# Patient Record
Sex: FEMALE | Race: WHITE
[De-identification: names, ages, dates, MRNs, and addresses within clinical notes are randomized per-mention and may not be internally consistent; named-entity substitution may affect disease eponyms.]

---

## 2017-05-23 ENCOUNTER — HOSPITAL ENCOUNTER (OUTPATIENT)
Dept: HOSPITAL 83 - RAD | Age: 77
Discharge: HOME | End: 2017-05-23
Attending: INTERNAL MEDICINE
Payer: MEDICARE

## 2017-05-23 DIAGNOSIS — M51.36: Primary | ICD-10-CM

## 2017-05-23 DIAGNOSIS — M41.86: ICD-10-CM

## 2017-05-23 DIAGNOSIS — M47.816: ICD-10-CM

## 2017-05-23 DIAGNOSIS — M48.06: ICD-10-CM

## 2017-05-23 DIAGNOSIS — M12.88: ICD-10-CM

## 2018-10-31 ENCOUNTER — HOSPITAL ENCOUNTER (OUTPATIENT)
Dept: HOSPITAL 83 - MAMMO | Age: 78
Discharge: HOME | End: 2018-10-31
Attending: PHYSICIAN ASSISTANT
Payer: MEDICARE

## 2018-10-31 DIAGNOSIS — Z12.31: Primary | ICD-10-CM

## 2020-08-24 ENCOUNTER — HOSPITAL ENCOUNTER (OUTPATIENT)
Dept: HOSPITAL 83 - MAMMO | Age: 80
Discharge: HOME | End: 2020-08-24
Attending: PHYSICIAN ASSISTANT
Payer: MEDICARE

## 2020-08-24 DIAGNOSIS — Z12.31: Primary | ICD-10-CM

## 2021-05-06 ENCOUNTER — HOSPITAL ENCOUNTER (OUTPATIENT)
Dept: HOSPITAL 83 - RAD | Age: 81
Discharge: HOME | End: 2021-05-06
Attending: PHYSICIAN ASSISTANT
Payer: MEDICARE

## 2021-05-06 DIAGNOSIS — M19.079: ICD-10-CM

## 2021-05-06 DIAGNOSIS — Z78.0: ICD-10-CM

## 2021-05-06 DIAGNOSIS — M85.89: Primary | ICD-10-CM

## 2021-11-02 ENCOUNTER — HOSPITAL ENCOUNTER (OUTPATIENT)
Dept: HOSPITAL 83 - MAMMO | Age: 81
Discharge: HOME | End: 2021-11-02
Attending: PHYSICIAN ASSISTANT
Payer: MEDICARE

## 2021-11-02 DIAGNOSIS — Z12.31: Primary | ICD-10-CM

## 2022-07-28 PROBLEM — M16.11 PRIMARY OSTEOARTHRITIS OF RIGHT HIP: Status: ACTIVE | Noted: 2022-07-28

## 2022-08-02 NOTE — H&P
[address] [address]                       PREOPERATIVE HISTORY AND PHYSICAL    PATIENT NAME: Rossy Blake                      :        1940  MED REC NO:   [id number]                            ROOM:  ACCOUNT NO:   [de-identified]                           ADMIT DATE: 2022  PROVIDER:     Leena Garcia. ALEJANDRO Russell    DATE OF SURGERY:  2022. Dictating for Demetrius Shankar M.D.    CHIEF COMPLAINT:  Right hip pain. HISTORY OF PRESENT ILLNESS:  This is an 80-year-old female with chronic  right hip pain secondary to osteoarthritis. She has failed conservative  treatments with anti-inflammatories, analgesics, and home exercises. She is now scheduled for right total hip arthroplasty. PAST MEDICAL HISTORY:  GERD, hypertension, and kidney disease. PAST SURGICAL HISTORY:  Right ankle ORIF and prior wrist surgery. CURRENT MEDICATIONS:  Tramadol, amitriptyline, trazodone,  triamterene/hydrochlorothiazide, irbesartan, Mobic, ropinirole, and  metoprolol. ALLERGIES:  None. FAMILY HISTORY:  Unremarkable. SOCIAL HISTORY:  Admits to social alcohol consumption. Denies tobacco  use. PRIMARY CARE PROVIDER:  Listed as Danielle Najera PA-C    REVIEW OF SYSTEMS:  ALLERGIES:  As stated above. SKIN AND LYMPHATICS:  Denies rashes or lesions. CNS:  No prior CVAs. RESPIRATORY:  No cough or shortness of breath. CIRCULATORY:  History of hypertension. DIGESTIVE:  History of GERD. GENITOURINARY:  History of kidney disease. METABOLIC AND ENDOCRINE:  No thyroid disease or diabetes. HEMATOLOGIC:  No anemia. MUSCULOSKELETAL:  Positive OA. PSYCHIATRIC:  Negative. PHYSICAL EXAMINATION:  GENERAL APPEARANCE:  A well-nourished and well-developed 80-year-old  female, in no acute distress. Alert and oriented x3. SKIN:  Without masses, rashes, or lesions. LYMPH NODES:  No adenopathy.   HEENT:  Head:  Normocephalic and atraumatic. Ears:  Clear and  functional.  Eyes and fundi:  PERRLA. Nose:  Clear without deviation. Mouth, teeth, and throat:  Clear and functional.  NECK:  Supple. No JVD. CHEST:  Normal excursion. BREASTS:  Deferred. LUNGS:  Clear to auscultation and percussion. HEART:  Regular rate and rhythm. No murmurs, gallops, or rubs  appreciated. ABDOMEN:  Rounded, soft, and nontender. Hernia negative. BACK:  Exam nontender. EXTREMITIES:  Without clubbing, cyanosis, or edema. Exam of right hip:   90 degrees of flexion. 10 degrees of flexion contracture. 0 degrees of  internal rotation or external rotation. 10 degrees of abduction, all  with pain and crepitus. 2/2 pulses. Neurovascular status distally is  intact. NEUROLOGIC:  Cranial nerves II through XII are grossly intact. GENITAL:  Exam deferred. RECTAL:  Exam deferred. DIAGNOSTIC IMPRESSION:  OA, right hip. PROCEDURE:  Right total hip arthroplasty.         Dawson Velez PA-C    D: 08/02/2022 8:46:31       T: 08/02/2022 9:44:06     [email or web address]  Job#: [id number]     Doc#: [id number]    CC:

## 2022-08-12 ENCOUNTER — HOSPITAL ENCOUNTER (OUTPATIENT)
Dept: PREADMISSION TESTING | Age: 82
Discharge: HOME | End: 2022-08-12
Payer: MEDICARE

## 2022-08-12 VITALS
WEIGHT: 204 LBS | BODY MASS INDEX: 36.14 KG/M2 | TEMPERATURE: 97.7 F | RESPIRATION RATE: 14 BRPM | DIASTOLIC BLOOD PRESSURE: 63 MMHG | OXYGEN SATURATION: 98 % | HEIGHT: 63 IN | SYSTOLIC BLOOD PRESSURE: 134 MMHG

## 2022-08-12 LAB
ANION GAP SERPL CALCULATED.3IONS-SCNC: 11 MMOL/L (ref 7–16)
BASOPHILS ABSOLUTE: 0.02 E9/L (ref 0–0.2)
BASOPHILS RELATIVE PERCENT: 0.3 % (ref 0–2)
BUN BLDV-MCNC: 26 MG/DL (ref 6–23)
CALCIUM SERPL-MCNC: 9.9 MG/DL (ref 8.6–10.2)
CHLORIDE BLD-SCNC: 93 MMOL/L (ref 98–107)
CO2: 27 MMOL/L (ref 22–29)
CREAT SERPL-MCNC: 1.1 MG/DL (ref 0.5–1)
EOSINOPHILS ABSOLUTE: 0.08 E9/L (ref 0.05–0.5)
EOSINOPHILS RELATIVE PERCENT: 1.2 % (ref 0–6)
GFR AFRICAN AMERICAN: 58
GFR NON-AFRICAN AMERICAN: 48 ML/MIN/1.73
GLUCOSE BLD-MCNC: 106 MG/DL (ref 74–99)
HCT VFR BLD CALC: 39.3 % (ref 34–48)
HEMOGLOBIN: 12.9 G/DL (ref 11.5–15.5)
IMMATURE GRANULOCYTES #: 0.01 E9/L
IMMATURE GRANULOCYTES %: 0.1 % (ref 0–5)
LYMPHOCYTES ABSOLUTE: 2.7 E9/L (ref 1.5–4)
LYMPHOCYTES RELATIVE PERCENT: 39.6 % (ref 20–42)
MCH RBC QN AUTO: 31.8 PG (ref 26–35)
MCHC RBC AUTO-ENTMCNC: 32.8 % (ref 32–34.5)
MCV RBC AUTO: 96.8 FL (ref 80–99.9)
MONOCYTES ABSOLUTE: 0.63 E9/L (ref 0.1–0.95)
MONOCYTES RELATIVE PERCENT: 9.3 % (ref 2–12)
NEUTROPHILS ABSOLUTE: 3.37 E9/L (ref 1.8–7.3)
NEUTROPHILS RELATIVE PERCENT: 49.5 % (ref 43–80)
PDW BLD-RTO: 12.3 FL (ref 11.5–15)
PLATELET # BLD: 273 E9/L (ref 130–450)
PMV BLD AUTO: 8.5 FL (ref 7–12)
POTASSIUM SERPL-SCNC: 3.9 MMOL/L (ref 3.5–5)
RBC # BLD: 4.06 E12/L (ref 3.5–5.5)
SODIUM BLD-SCNC: 131 MMOL/L (ref 132–146)
WBC # BLD: 6.8 E9/L (ref 4.5–11.5)

## 2022-08-12 PROCEDURE — 85025 COMPLETE CBC W/AUTO DIFF WBC: CPT

## 2022-08-12 PROCEDURE — 36415 COLL VENOUS BLD VENIPUNCTURE: CPT

## 2022-08-12 PROCEDURE — 87081 CULTURE SCREEN ONLY: CPT

## 2022-08-12 PROCEDURE — 80048 BASIC METABOLIC PNL TOTAL CA: CPT

## 2022-08-12 RX ORDER — TRIAMTERENE AND HYDROCHLOROTHIAZIDE 37.5; 25 MG/1; MG/1
1 TABLET ORAL DAILY
COMMUNITY

## 2022-08-12 RX ORDER — TRAMADOL HYDROCHLORIDE 50 MG/1
50 TABLET ORAL 2 TIMES DAILY PRN
Status: ON HOLD | COMMUNITY
End: 2022-08-18 | Stop reason: HOSPADM

## 2022-08-12 RX ORDER — TRAZODONE HYDROCHLORIDE 50 MG/1
50 TABLET ORAL NIGHTLY
COMMUNITY

## 2022-08-12 RX ORDER — METOPROLOL SUCCINATE 100 MG/1
200 TABLET, EXTENDED RELEASE ORAL DAILY
COMMUNITY

## 2022-08-12 RX ORDER — IRBESARTAN 75 MG/1
75 TABLET ORAL DAILY
COMMUNITY

## 2022-08-12 RX ORDER — ROPINIROLE 1 MG/1
1 TABLET, FILM COATED ORAL EVERY EVENING
COMMUNITY

## 2022-08-12 ASSESSMENT — PAIN DESCRIPTION - FREQUENCY: FREQUENCY: INTERMITTENT

## 2022-08-12 ASSESSMENT — PAIN DESCRIPTION - LOCATION: LOCATION: HIP

## 2022-08-12 ASSESSMENT — PAIN DESCRIPTION - DESCRIPTORS: DESCRIPTORS: ACHING;JABBING

## 2022-08-12 ASSESSMENT — PAIN DESCRIPTION - ORIENTATION: ORIENTATION: RIGHT

## 2022-08-12 ASSESSMENT — PAIN SCALES - GENERAL: PAINLEVEL_OUTOF10: 9

## 2022-08-12 ASSESSMENT — PAIN - FUNCTIONAL ASSESSMENT: PAIN_FUNCTIONAL_ASSESSMENT: PREVENTS OR INTERFERES WITH MANY ACTIVE NOT PASSIVE ACTIVITIES

## 2022-08-12 ASSESSMENT — PAIN DESCRIPTION - PAIN TYPE: TYPE: CHRONIC PAIN

## 2022-08-12 NOTE — PROGRESS NOTES
I met with Mrs. Danny Cleaning, and her  this am  for an orthopaedic education class. We discussed information regarding pre, intra, post-op, discharge, and LOS expectations. I provided ortho education materials. I encouraged the patient to call with any questions or concerns.

## 2022-08-12 NOTE — PROGRESS NOTES
[facility] PRE-ADMISSION TESTING INSTRUCTIONS    The Preadmission Testing patient is instructed accordingly using the following criteria (check applicable):    ARRIVAL INSTRUCTIONS:  [x] Parking the day of Surgery is located in the Main Entrance lot. Upon entering the door, make an immediate right to the surgery reception desk    [x] Bring photo ID and insurance card    [] Bring in a copy of Living will or Durable Power of  papers. [x] Please be sure to arrange for responsible adult to provide transportation to and from the hospital    [x] Please arrange for responsible adult to be with you for the 24 hour period post procedure due to having anesthesia    [x] If you awake am of surgery not feeling well or have temperature >100 please call [phone]    GENERAL INSTRUCTIONS:    [x] Nothing by mouth after midnight, including gum, candy, mints or water    [x] You may brush your teeth, but do not swallow any water    [x] Take medications as instructed with 1-2 oz of water    [x] Stop herbal supplements and vitamins 5 days prior to procedure    [] Follow preop dosing of blood thinners per physician instructions    [] Take 1/2 dose of evening insulin, but no insulin after midnight    [] No oral diabetic medications after midnight    [] If diabetic and have low blood sugar or feel symptomatic, take 1-2oz apple juice only    [] Bring inhalers day of surgery    [] Bring C-PAP/ Bi-Pap day of surgery    [] Bring urine specimen day of surgery    [] Shower or bath with soap, lather and rinse well, AM of Surgery, no lotion, powders or creams to surgical site    [] Follow bowel prep as instructed per surgeon    [x] No tobacco products within 24 hours of surgery     [x] No alcohol or illegal drug use within 24 hours of surgery.     [x] Jewelry, body piercing's, eyeglasses, contact lenses and dentures are not permitted into surgery (bring cases)      [x] Please do not wear any nail polish, make up or hair products on the day of surgery    [x] You can expect a call the business day prior to procedure to notify you if your arrival time changes    [x] If you receive a survey after surgery we would greatly appreciate your comments    [] Parent/guardian of a minor must accompany their child and remain on the premises  the entire time they are under our care     [] Pediatric patients may bring favorite toy, blanket or comfort item with them    [] A caregiver or family member must remain with the patient during their stay if they are mentally handicapped, have dementia, disoriented or unable to use a call light or would be a safety concern if left unattended    [x] Please notify surgeon if you develop any illness between now and time of surgery (cold, cough, sore throat, fever, nausea, vomiting) or any signs of infections  including skin, wounds, and dental.    [x]  The Outpatient Pharmacy is available to fill your prescription here on your day of surgery, ask your preop nurse for details    [] Other instructions    EDUCATIONAL MATERIALS PROVIDED:    [x] PAT Preoperative Education Packet/Booklet     [x] Medication List    [] Transfusion bracelet applied with instructions    [x] Shower with soap, lather and rinse well, and use CHG wipes provided the evening before surgery as instructed    [x] Incentive spirometer with instructions

## 2022-08-14 LAB — MRSA CULTURE ONLY: NORMAL

## 2022-08-16 NOTE — CARE COORDINATION
Social Work:    Mrs. Dakota William is scheduled for a right CHRISTINA on Wednesday, August 17th. Social service met with Maryann Hernandez in ortho camp on August 12th and provided her with all information related to the discharge process. Maryann Hernandez resides with her  in a ranch home with one entry step, a walk-in shower and standard commode set-up. She is prepared to return home with [facility] & -DME (wheeled walker & 3-1 commode) after choices were provided.      Electronically signed by LISA Saez on 8/16/2022 at 12:57 PM

## 2022-08-17 ENCOUNTER — APPOINTMENT (OUTPATIENT)
Dept: GENERAL RADIOLOGY | Age: 82
End: 2022-08-17
Attending: ORTHOPAEDIC SURGERY
Payer: MEDICARE

## 2022-08-17 ENCOUNTER — ANESTHESIA EVENT (OUTPATIENT)
Dept: OPERATING ROOM | Age: 82
End: 2022-08-17
Payer: MEDICARE

## 2022-08-17 ENCOUNTER — HOSPITAL ENCOUNTER (OUTPATIENT)
Age: 82
Setting detail: OBSERVATION
Discharge: HOME | End: 2022-08-18
Attending: ORTHOPAEDIC SURGERY | Admitting: ORTHOPAEDIC SURGERY
Payer: MEDICARE

## 2022-08-17 ENCOUNTER — ANESTHESIA (OUTPATIENT)
Dept: OPERATING ROOM | Age: 82
End: 2022-08-17
Payer: MEDICARE

## 2022-08-17 DIAGNOSIS — Z47.1 AFTERCARE FOLLOWING JOINT REPLACEMENT SURGERY, UNSPECIFIED JOINT: ICD-10-CM

## 2022-08-17 DIAGNOSIS — Z96.641 STATUS POST RIGHT HIP REPLACEMENT: ICD-10-CM

## 2022-08-17 DIAGNOSIS — M16.11 PRIMARY OSTEOARTHRITIS OF RIGHT HIP: Primary | ICD-10-CM

## 2022-08-17 DIAGNOSIS — M16.11 UNILATERAL PRIMARY OSTEOARTHRITIS, RIGHT HIP: ICD-10-CM

## 2022-08-17 PROCEDURE — 73501 X-RAY EXAM HIP UNI 1 VIEW: CPT

## 2022-08-17 PROCEDURE — 6370000000 HC RX 637 (ALT 250 FOR IP): Performed by: PHYSICIAN ASSISTANT

## 2022-08-17 PROCEDURE — 3600000015 HC SURGERY LEVEL 5 ADDTL 15MIN: Performed by: ORTHOPAEDIC SURGERY

## 2022-08-17 PROCEDURE — 6360000002 HC RX W HCPCS: Performed by: ORTHOPAEDIC SURGERY

## 2022-08-17 PROCEDURE — 7100000001 HC PACU RECOVERY - ADDTL 15 MIN: Performed by: ORTHOPAEDIC SURGERY

## 2022-08-17 PROCEDURE — 6360000002 HC RX W HCPCS: Performed by: ANESTHESIOLOGY

## 2022-08-17 PROCEDURE — 6370000000 HC RX 637 (ALT 250 FOR IP): Performed by: ORTHOPAEDIC SURGERY

## 2022-08-17 PROCEDURE — 2500000003 HC RX 250 WO HCPCS: Performed by: NURSE ANESTHETIST, CERTIFIED REGISTERED

## 2022-08-17 PROCEDURE — 2500000003 HC RX 250 WO HCPCS: Performed by: ORTHOPAEDIC SURGERY

## 2022-08-17 PROCEDURE — G0378 HOSPITAL OBSERVATION PER HR: HCPCS

## 2022-08-17 PROCEDURE — 97530 THERAPEUTIC ACTIVITIES: CPT

## 2022-08-17 PROCEDURE — 2580000003 HC RX 258: Performed by: ORTHOPAEDIC SURGERY

## 2022-08-17 PROCEDURE — 6360000002 HC RX W HCPCS: Performed by: PHYSICIAN ASSISTANT

## 2022-08-17 PROCEDURE — 2580000003 HC RX 258: Performed by: NURSE ANESTHETIST, CERTIFIED REGISTERED

## 2022-08-17 PROCEDURE — 51798 US URINE CAPACITY MEASURE: CPT

## 2022-08-17 PROCEDURE — 7100000000 HC PACU RECOVERY - FIRST 15 MIN: Performed by: ORTHOPAEDIC SURGERY

## 2022-08-17 PROCEDURE — 3700000001 HC ADD 15 MINUTES (ANESTHESIA): Performed by: ORTHOPAEDIC SURGERY

## 2022-08-17 PROCEDURE — 88311 DECALCIFY TISSUE: CPT

## 2022-08-17 PROCEDURE — 3700000000 HC ANESTHESIA ATTENDED CARE: Performed by: ORTHOPAEDIC SURGERY

## 2022-08-17 PROCEDURE — 2709999900 HC NON-CHARGEABLE SUPPLY: Performed by: ORTHOPAEDIC SURGERY

## 2022-08-17 PROCEDURE — 88304 TISSUE EXAM BY PATHOLOGIST: CPT

## 2022-08-17 PROCEDURE — 3600000005 HC SURGERY LEVEL 5 BASE: Performed by: ORTHOPAEDIC SURGERY

## 2022-08-17 PROCEDURE — 6360000002 HC RX W HCPCS: Performed by: NURSE ANESTHETIST, CERTIFIED REGISTERED

## 2022-08-17 PROCEDURE — 2580000003 HC RX 258: Performed by: PHYSICIAN ASSISTANT

## 2022-08-17 PROCEDURE — C1776 JOINT DEVICE (IMPLANTABLE): HCPCS | Performed by: ORTHOPAEDIC SURGERY

## 2022-08-17 PROCEDURE — 97161 PT EVAL LOW COMPLEX 20 MIN: CPT

## 2022-08-17 PROCEDURE — 97165 OT EVAL LOW COMPLEX 30 MIN: CPT

## 2022-08-17 PROCEDURE — 2720000010 HC SURG SUPPLY STERILE: Performed by: ORTHOPAEDIC SURGERY

## 2022-08-17 DEVICE — SCREW BNE L40MM DIA6.5MM LO PROF HEX TRIDENT LL: Type: IMPLANTABLE DEVICE | Site: HIP | Status: FUNCTIONAL

## 2022-08-17 DEVICE — SCREW BNE L30MM DIA6.5MM STD CANC HIP TI HMSPHR THRD DRVR: Type: IMPLANTABLE DEVICE | Site: HIP | Status: FUNCTIONAL

## 2022-08-17 DEVICE — INSERT ACET D 0 DEG 32 MM HIP X3 TRIDENT: Type: IMPLANTABLE DEVICE | Site: HIP | Status: FUNCTIONAL

## 2022-08-17 DEVICE — STEM FEM SZ 4 L105MM NK L35MM 38MM OFFSET 132DEG HIP TI: Type: IMPLANTABLE DEVICE | Site: HIP | Status: FUNCTIONAL

## 2022-08-17 DEVICE — SHELL ACET SZ D DIA48MM 3 CLUS H TRITANIUM PRESSFIT PRI: Type: IMPLANTABLE DEVICE | Site: HIP | Status: FUNCTIONAL

## 2022-08-17 DEVICE — IMPLANTABLE DEVICE: Type: IMPLANTABLE DEVICE | Site: HIP | Status: FUNCTIONAL

## 2022-08-17 RX ORDER — ONDANSETRON 2 MG/ML
4 INJECTION INTRAMUSCULAR; INTRAVENOUS
Status: DISCONTINUED | OUTPATIENT
Start: 2022-08-17 | End: 2022-08-17 | Stop reason: HOSPADM

## 2022-08-17 RX ORDER — TRIAMTERENE AND HYDROCHLOROTHIAZIDE 37.5; 25 MG/1; MG/1
1 TABLET ORAL DAILY
Status: DISCONTINUED | OUTPATIENT
Start: 2022-08-17 | End: 2022-08-18 | Stop reason: HOSPADM

## 2022-08-17 RX ORDER — ROPINIROLE 1 MG/1
1 TABLET, FILM COATED ORAL EVERY EVENING
Status: DISCONTINUED | OUTPATIENT
Start: 2022-08-17 | End: 2022-08-18 | Stop reason: HOSPADM

## 2022-08-17 RX ORDER — SODIUM CHLORIDE 0.9 % (FLUSH) 0.9 %
5-40 SYRINGE (ML) INJECTION PRN
Status: DISCONTINUED | OUTPATIENT
Start: 2022-08-17 | End: 2022-08-17 | Stop reason: HOSPADM

## 2022-08-17 RX ORDER — GLYCOPYRROLATE 0.2 MG/ML
INJECTION INTRAMUSCULAR; INTRAVENOUS PRN
Status: DISCONTINUED | OUTPATIENT
Start: 2022-08-17 | End: 2022-08-17 | Stop reason: SDUPTHER

## 2022-08-17 RX ORDER — HYDROCODONE BITARTRATE AND ACETAMINOPHEN 5; 325 MG/1; MG/1
2 TABLET ORAL EVERY 4 HOURS PRN
Status: DISCONTINUED | OUTPATIENT
Start: 2022-08-17 | End: 2022-08-18 | Stop reason: HOSPADM

## 2022-08-17 RX ORDER — SODIUM CHLORIDE 9 MG/ML
INJECTION, SOLUTION INTRAVENOUS PRN
Status: DISCONTINUED | OUTPATIENT
Start: 2022-08-17 | End: 2022-08-18 | Stop reason: HOSPADM

## 2022-08-17 RX ORDER — SODIUM CHLORIDE 9 MG/ML
INJECTION, SOLUTION INTRAVENOUS PRN
Status: DISCONTINUED | OUTPATIENT
Start: 2022-08-17 | End: 2022-08-17 | Stop reason: HOSPADM

## 2022-08-17 RX ORDER — DEXAMETHASONE SODIUM PHOSPHATE 10 MG/ML
10 INJECTION INTRAMUSCULAR; INTRAVENOUS ONCE
Status: COMPLETED | OUTPATIENT
Start: 2022-08-18 | End: 2022-08-18

## 2022-08-17 RX ORDER — MEPERIDINE HYDROCHLORIDE 25 MG/ML
12.5 INJECTION INTRAMUSCULAR; INTRAVENOUS; SUBCUTANEOUS EVERY 5 MIN PRN
Status: DISCONTINUED | OUTPATIENT
Start: 2022-08-17 | End: 2022-08-17 | Stop reason: HOSPADM

## 2022-08-17 RX ORDER — LOSARTAN POTASSIUM 25 MG/1
25 TABLET ORAL DAILY
Status: DISCONTINUED | OUTPATIENT
Start: 2022-08-18 | End: 2022-08-18 | Stop reason: HOSPADM

## 2022-08-17 RX ORDER — ACETAMINOPHEN 500 MG
1000 TABLET ORAL ONCE
Status: COMPLETED | OUTPATIENT
Start: 2022-08-17 | End: 2022-08-17

## 2022-08-17 RX ORDER — FENTANYL CITRATE 50 UG/ML
INJECTION, SOLUTION INTRAMUSCULAR; INTRAVENOUS PRN
Status: DISCONTINUED | OUTPATIENT
Start: 2022-08-17 | End: 2022-08-17 | Stop reason: SDUPTHER

## 2022-08-17 RX ORDER — ONDANSETRON 4 MG/1
4 TABLET, ORALLY DISINTEGRATING ORAL EVERY 8 HOURS PRN
Status: DISCONTINUED | OUTPATIENT
Start: 2022-08-17 | End: 2022-08-18 | Stop reason: HOSPADM

## 2022-08-17 RX ORDER — DEXAMETHASONE SODIUM PHOSPHATE 10 MG/ML
8 INJECTION, SOLUTION INTRAMUSCULAR; INTRAVENOUS ONCE
Status: DISCONTINUED | OUTPATIENT
Start: 2022-08-17 | End: 2022-08-17 | Stop reason: HOSPADM

## 2022-08-17 RX ORDER — SODIUM CHLORIDE 9 MG/ML
INJECTION, SOLUTION INTRAVENOUS CONTINUOUS
Status: DISCONTINUED | OUTPATIENT
Start: 2022-08-17 | End: 2022-08-17

## 2022-08-17 RX ORDER — ONDANSETRON 2 MG/ML
4 INJECTION INTRAMUSCULAR; INTRAVENOUS EVERY 6 HOURS PRN
Status: DISCONTINUED | OUTPATIENT
Start: 2022-08-17 | End: 2022-08-18 | Stop reason: HOSPADM

## 2022-08-17 RX ORDER — SODIUM CHLORIDE 9 MG/ML
INJECTION, SOLUTION INTRAVENOUS CONTINUOUS PRN
Status: DISCONTINUED | OUTPATIENT
Start: 2022-08-17 | End: 2022-08-17 | Stop reason: SDUPTHER

## 2022-08-17 RX ORDER — SODIUM CHLORIDE 0.9 % (FLUSH) 0.9 %
5-40 SYRINGE (ML) INJECTION EVERY 12 HOURS SCHEDULED
Status: DISCONTINUED | OUTPATIENT
Start: 2022-08-17 | End: 2022-08-17 | Stop reason: HOSPADM

## 2022-08-17 RX ORDER — PROPOFOL 10 MG/ML
INJECTION, EMULSION INTRAVENOUS PRN
Status: DISCONTINUED | OUTPATIENT
Start: 2022-08-17 | End: 2022-08-17 | Stop reason: SDUPTHER

## 2022-08-17 RX ORDER — DEXAMETHASONE SODIUM PHOSPHATE 4 MG/ML
INJECTION, SOLUTION INTRA-ARTICULAR; INTRALESIONAL; INTRAMUSCULAR; INTRAVENOUS; SOFT TISSUE PRN
Status: DISCONTINUED | OUTPATIENT
Start: 2022-08-17 | End: 2022-08-17 | Stop reason: SDUPTHER

## 2022-08-17 RX ORDER — PHENYLEPHRINE HCL IN 0.9% NACL 1 MG/10 ML
SYRINGE (ML) INTRAVENOUS PRN
Status: DISCONTINUED | OUTPATIENT
Start: 2022-08-17 | End: 2022-08-17 | Stop reason: SDUPTHER

## 2022-08-17 RX ORDER — METOPROLOL SUCCINATE 100 MG/1
200 TABLET, EXTENDED RELEASE ORAL DAILY
Status: DISCONTINUED | OUTPATIENT
Start: 2022-08-18 | End: 2022-08-18 | Stop reason: HOSPADM

## 2022-08-17 RX ORDER — HYDROCODONE BITARTRATE AND ACETAMINOPHEN 5; 325 MG/1; MG/1
1 TABLET ORAL EVERY 4 HOURS PRN
Status: DISCONTINUED | OUTPATIENT
Start: 2022-08-17 | End: 2022-08-18 | Stop reason: HOSPADM

## 2022-08-17 RX ORDER — SODIUM CHLORIDE 0.9 % (FLUSH) 0.9 %
5-40 SYRINGE (ML) INJECTION EVERY 12 HOURS SCHEDULED
Status: DISCONTINUED | OUTPATIENT
Start: 2022-08-17 | End: 2022-08-18 | Stop reason: HOSPADM

## 2022-08-17 RX ORDER — NEOSTIGMINE METHYLSULFATE 1 MG/ML
INJECTION, SOLUTION INTRAVENOUS PRN
Status: DISCONTINUED | OUTPATIENT
Start: 2022-08-17 | End: 2022-08-17 | Stop reason: SDUPTHER

## 2022-08-17 RX ORDER — ROCURONIUM BROMIDE 10 MG/ML
INJECTION, SOLUTION INTRAVENOUS PRN
Status: DISCONTINUED | OUTPATIENT
Start: 2022-08-17 | End: 2022-08-17 | Stop reason: SDUPTHER

## 2022-08-17 RX ORDER — CELECOXIB 100 MG/1
100 CAPSULE ORAL ONCE
Status: COMPLETED | OUTPATIENT
Start: 2022-08-17 | End: 2022-08-17

## 2022-08-17 RX ORDER — LIDOCAINE HYDROCHLORIDE 10 MG/ML
INJECTION, SOLUTION EPIDURAL; INFILTRATION; INTRACAUDAL; PERINEURAL PRN
Status: DISCONTINUED | OUTPATIENT
Start: 2022-08-17 | End: 2022-08-17 | Stop reason: SDUPTHER

## 2022-08-17 RX ORDER — HYDRALAZINE HYDROCHLORIDE 20 MG/ML
INJECTION INTRAMUSCULAR; INTRAVENOUS PRN
Status: DISCONTINUED | OUTPATIENT
Start: 2022-08-17 | End: 2022-08-17 | Stop reason: SDUPTHER

## 2022-08-17 RX ORDER — SODIUM CHLORIDE 9 MG/ML
INJECTION, SOLUTION INTRAVENOUS CONTINUOUS
Status: DISCONTINUED | OUTPATIENT
Start: 2022-08-17 | End: 2022-08-18 | Stop reason: HOSPADM

## 2022-08-17 RX ORDER — SODIUM CHLORIDE 0.9 % (FLUSH) 0.9 %
5-40 SYRINGE (ML) INJECTION PRN
Status: DISCONTINUED | OUTPATIENT
Start: 2022-08-17 | End: 2022-08-18 | Stop reason: HOSPADM

## 2022-08-17 RX ORDER — DOCUSATE SODIUM 100 MG/1
100 CAPSULE, LIQUID FILLED ORAL 2 TIMES DAILY PRN
Status: DISCONTINUED | OUTPATIENT
Start: 2022-08-17 | End: 2022-08-18 | Stop reason: HOSPADM

## 2022-08-17 RX ADMIN — GLYCOPYRROLATE 0.6 MG: 0.2 INJECTION INTRAMUSCULAR; INTRAVENOUS at 11:53

## 2022-08-17 RX ADMIN — PROPOFOL 140 MG: 10 INJECTION, EMULSION INTRAVENOUS at 09:34

## 2022-08-17 RX ADMIN — SODIUM CHLORIDE: 9 INJECTION, SOLUTION INTRAVENOUS at 12:06

## 2022-08-17 RX ADMIN — FENTANYL CITRATE 50 MCG: 50 INJECTION, SOLUTION INTRAMUSCULAR; INTRAVENOUS at 09:21

## 2022-08-17 RX ADMIN — DEXAMETHASONE SODIUM PHOSPHATE 10 MG: 4 INJECTION, SOLUTION INTRAMUSCULAR; INTRAVENOUS at 09:54

## 2022-08-17 RX ADMIN — SODIUM CHLORIDE: 9 INJECTION, SOLUTION INTRAVENOUS at 10:24

## 2022-08-17 RX ADMIN — SODIUM CHLORIDE: 9 INJECTION, SOLUTION INTRAVENOUS at 20:23

## 2022-08-17 RX ADMIN — FENTANYL CITRATE 50 MCG: 50 INJECTION, SOLUTION INTRAMUSCULAR; INTRAVENOUS at 09:11

## 2022-08-17 RX ADMIN — Medication 100 MCG: at 11:06

## 2022-08-17 RX ADMIN — ROCURONIUM BROMIDE 50 MG: 10 INJECTION, SOLUTION INTRAVENOUS at 09:34

## 2022-08-17 RX ADMIN — FENTANYL CITRATE 50 MCG: 50 INJECTION, SOLUTION INTRAMUSCULAR; INTRAVENOUS at 09:59

## 2022-08-17 RX ADMIN — FENTANYL CITRATE 50 MCG: 50 INJECTION, SOLUTION INTRAMUSCULAR; INTRAVENOUS at 10:13

## 2022-08-17 RX ADMIN — SODIUM CHLORIDE: 9 INJECTION, SOLUTION INTRAVENOUS at 08:52

## 2022-08-17 RX ADMIN — Medication 100 MCG: at 10:44

## 2022-08-17 RX ADMIN — TRIAMTERENE AND HYDROCHLOROTHIAZIDE 1 TABLET: 37.5; 25 TABLET ORAL at 20:00

## 2022-08-17 RX ADMIN — ROPINIROLE HYDROCHLORIDE 1 MG: 1 TABLET, FILM COATED ORAL at 20:00

## 2022-08-17 RX ADMIN — Medication 100 MCG: at 11:18

## 2022-08-17 RX ADMIN — HYDROMORPHONE HYDROCHLORIDE 0.5 MG: 1 INJECTION, SOLUTION INTRAMUSCULAR; INTRAVENOUS; SUBCUTANEOUS at 12:38

## 2022-08-17 RX ADMIN — ACETAMINOPHEN 1000 MG: 500 TABLET ORAL at 08:21

## 2022-08-17 RX ADMIN — FENTANYL CITRATE 100 MCG: 50 INJECTION, SOLUTION INTRAMUSCULAR; INTRAVENOUS at 12:21

## 2022-08-17 RX ADMIN — HYDROCODONE BITARTRATE AND ACETAMINOPHEN 2 TABLET: 5; 325 TABLET ORAL at 18:14

## 2022-08-17 RX ADMIN — LIDOCAINE HYDROCHLORIDE 50 MG: 10 INJECTION, SOLUTION EPIDURAL; INFILTRATION; INTRACAUDAL; PERINEURAL at 09:34

## 2022-08-17 RX ADMIN — Medication 100 MCG: at 11:35

## 2022-08-17 RX ADMIN — HYDROCODONE BITARTRATE AND ACETAMINOPHEN 2 TABLET: 5; 325 TABLET ORAL at 14:30

## 2022-08-17 RX ADMIN — HYDRALAZINE HYDROCHLORIDE 5 MG: 20 INJECTION INTRAMUSCULAR; INTRAVENOUS at 10:12

## 2022-08-17 RX ADMIN — FENTANYL CITRATE 50 MCG: 50 INJECTION, SOLUTION INTRAMUSCULAR; INTRAVENOUS at 09:51

## 2022-08-17 RX ADMIN — CEFAZOLIN 2000 MG: 2 INJECTION, POWDER, FOR SOLUTION INTRAMUSCULAR; INTRAVENOUS at 09:26

## 2022-08-17 RX ADMIN — CEFAZOLIN 2000 MG: 2 INJECTION, POWDER, FOR SOLUTION INTRAMUSCULAR; INTRAVENOUS at 21:10

## 2022-08-17 RX ADMIN — Medication 100 MCG: at 11:25

## 2022-08-17 RX ADMIN — Medication 3 MG: at 11:53

## 2022-08-17 RX ADMIN — Medication 100 MCG: at 10:37

## 2022-08-17 RX ADMIN — CELECOXIB 100 MG: 100 CAPSULE ORAL at 08:22

## 2022-08-17 RX ADMIN — HYDROCODONE BITARTRATE AND ACETAMINOPHEN 2 TABLET: 5; 325 TABLET ORAL at 22:15

## 2022-08-17 RX ADMIN — FENTANYL CITRATE 50 MCG: 50 INJECTION, SOLUTION INTRAMUSCULAR; INTRAVENOUS at 10:10

## 2022-08-17 RX ADMIN — ASPIRIN 325 MG: 325 TABLET, COATED ORAL at 20:00

## 2022-08-17 RX ADMIN — SODIUM CHLORIDE: 9 INJECTION, SOLUTION INTRAVENOUS at 14:24

## 2022-08-17 RX ADMIN — HYDROMORPHONE HYDROCHLORIDE 0.5 MG: 1 INJECTION, SOLUTION INTRAMUSCULAR; INTRAVENOUS; SUBCUTANEOUS at 12:51

## 2022-08-17 ASSESSMENT — PAIN DESCRIPTION - PAIN TYPE
TYPE: SURGICAL PAIN
TYPE: SURGICAL PAIN

## 2022-08-17 ASSESSMENT — PAIN SCALES - GENERAL
PAINLEVEL_OUTOF10: 0
PAINLEVEL_OUTOF10: 6
PAINLEVEL_OUTOF10: 7
PAINLEVEL_OUTOF10: 0
PAINLEVEL_OUTOF10: 8
PAINLEVEL_OUTOF10: 7
PAINLEVEL_OUTOF10: 8
PAINLEVEL_OUTOF10: 7
PAINLEVEL_OUTOF10: 7

## 2022-08-17 ASSESSMENT — PAIN DESCRIPTION - LOCATION
LOCATION: HIP

## 2022-08-17 ASSESSMENT — PAIN - FUNCTIONAL ASSESSMENT
PAIN_FUNCTIONAL_ASSESSMENT: 0-10
PAIN_FUNCTIONAL_ASSESSMENT: PREVENTS OR INTERFERES SOME ACTIVE ACTIVITIES AND ADLS
PAIN_FUNCTIONAL_ASSESSMENT: ACTIVITIES ARE NOT PREVENTED
PAIN_FUNCTIONAL_ASSESSMENT: ACTIVITIES ARE NOT PREVENTED

## 2022-08-17 ASSESSMENT — PAIN DESCRIPTION - ORIENTATION
ORIENTATION: RIGHT

## 2022-08-17 ASSESSMENT — LIFESTYLE VARIABLES: SMOKING_STATUS: 0

## 2022-08-17 ASSESSMENT — PAIN DESCRIPTION - DESCRIPTORS
DESCRIPTORS: ACHING;DISCOMFORT
DESCRIPTORS: ACHING;DISCOMFORT
DESCRIPTORS: DISCOMFORT

## 2022-08-17 ASSESSMENT — PAIN DESCRIPTION - FREQUENCY
FREQUENCY: CONTINUOUS
FREQUENCY: CONTINUOUS

## 2022-08-17 ASSESSMENT — PAIN DESCRIPTION - ONSET
ONSET: ON-GOING
ONSET: ON-GOING

## 2022-08-17 NOTE — CARE COORDINATION
Case Management: Social Work Case Management Initial Assessment  Michela Genao,         Met with:patient, spouse, daughter, granddaughter  Information verified: address, contacts, phone number, , insurance Yes  PCP: [address]:   [address] [facility], [address] [facility] [phone] [place] [facility] [phone]  [address]  [address] [address] [address]  Phone: [phone] Fax: [facility] [address] [facility] , [address] [place] [address] [phone] [facility] [phone]  Marino Arambula [address]  Phone: [phone] Fax: [phone]         Discharge Planning  Patient Status Order: Observation Date: 22  Readmission within last 30 days:  no  Current Residence: [address] [place] [address]. [facility] [place]  Living Arrangements:  Children   Home Access: ranch  Entrance Stairs-Number of Steps: small threshold  Support Systems:  Spouse/Significant Other  Current Services PTA:   Supplier: n/a  Patient able to perform ADL's: some assistance needed  DME used to aid ambulation prior to admission:n/a    Potential Assistance Needed:  N/A  Potential Assistance Purchasing Medications:  No  Does patient want to participate in local refill/meds to beds program?: Yes    Patient agreeable to home care: Yes  Type of Home Care Services:  None  Patient expects to be discharged to:  home    Prior SNF/Rehab Placement and Facility: no  Agreeable to SNF/Rehab: No    Choices given to patient: yes    Expected Discharge date:    Follow Up Appointment: Best Day/ Time: Monday AM    Social Work Assessment/Plan:  Social service met with Mrs. Lang & her  in [facility], as well as upon arrival to the unit today. Mrs. Clemencia Quijano is [facility] and [facility]DME for a wheeled walker & 3-1 commode. Social work called referrals in to both today.     Electronically signed by LISA Moise on 22 at 2:45 PM EDT    The Plan for Transition of Care is

## 2022-08-17 NOTE — OP NOTE
Operative Note      Patient: Di Villatoro  YOB: 1940  MRN: [id number]    Date of Procedure: 8/17/2022    Pre-Op Diagnosis: Unilateral primary osteoarthritis, right hip [M16.11]    Post-Op Diagnosis: Same       Procedure(s):  RIGHT TOTAL HIP ARTHROPLASTY ++WESTON++    Surgeon(s):  Mimi Munoz MD    Assistant:   Surgical Assistant: James Catalan RN  Physician Assistant: SOILA Newsome    Anesthesia: General    Estimated Blood Loss (mL): 897     Complications: None    Specimens:   ID Type Source Tests Collected by Time Destination   A : RIGHT FEMORAL HEAD Bone Bone SURGICAL PATHOLOGY Mimi Munoz MD 8/17/2022 1059        Implants:  Implant Name Type Inv. Item Serial No.  Lot No. LRB No. Used Action   SHELL ACET SZ D [id number] CLUS H TRITANIUM PRESSFIT MAAME - [id number]  SHELL ACET SZ D [id number] 3 CLUS H TRITANIUM PRESSFIT MAAME  [employer] [facility] [id number] Right 1 Implanted   INSERT ACET D 0 DEG 32 MM HIP X3 TRIDENT - [id number]  INSERT ACET D 0 DEG 32 MM HIP X3 TRIDENT  [employer] [facility] [id number] Right 1 Implanted   STEM FEM SZ 4 L105MM NK L35MM 38MM OFFSET 132DEG HIP TI - [id number]  STEM FEM SZ 4 L105MM NK L35MM 38MM OFFSET 132DEG HIP TI  [employer] [facility] [id number] Right 1 Implanted   SCREW BNE L30MM DIA6.5MM STD CANC HIP TI HMSPHR THRD DRVR - [id number]  SCREW BNE L30MM DIA6.5MM STD CANC HIP TI HMSPHR THRD DRVR  [employer] [facility] [id number] Right 1 Implanted   SCREW BNE L40MM DIA6. 5MM LO PROF HEX TRIDENT LL - [id number]  SCREW BNE L40MM DIA6. 5MM LO PROF HEX TRIDENT LL  [employer] [facility] [id number] Right 1 Implanted   IMPL HIP FEM HEAD V40 TAPR VITALLIUM 0MM - [id number] Hip IMPL HIP FEM HEAD V40 TAPR VITALLIUM 0MM  [employer]: [employer]-PMM [id number] Right 1 Implanted         Drains: * No LDAs found *    Findings: Severe OA    Detailed Description of Procedure:       HISTORY:  This patient is a 81 y.o.  female  with progressive pain in the right hip. X-rays and evaluation revealed significant osteoarthritis of the right hip, bone-on-bone in the weightbearing aspect, with significant peripheral osteophytes. The patient had tried or considered all conservative options applicable, including  anti-inflammatories, weight loss and exercise, without success. Having failed conservative options, it was felt the patient would benefit from a total hip arthroplasty. The patient was cleared medically, came to the operating room on 8/17/2022 . PROCEDURE:  The patient came to the operating room and underwent  general anesthesia after an unsuccessful attempt at spinal anesthesia. The patient was positioned in the left lateral decubitus position for a right hip procedure. The area of the right hip and lower extremity were prepped and draped in the usual sterile manner. We made a standard curvilinear incision for a posterior approach to the right hip. We carried incision through the skin and subcutaneous fat to layer of the fascia. We crossed the fascia in line with the incision to expose the greater trochanter. We took down the short external rotators from the posterior aspect of the greater trochanter to expose the posterior aspect of the hip joint capsule. We incised the capsule around the base of the neck and Td the capsule back toward the acetabular rim. We put tag sutures in the posterior leaflets of the capsule. We posteriorly dislocated the hip. We made a femoral neck cut using a size 4 broach with a 132 degree neck with a 32+0 head as a template for our femoral neck cut. We removed the femoral head. We then turned our attention to the femur. With the hip flexed and internally rotated we gained access to the proximal femur using first a box chisel followed by the canal finding reamer. We broached to a size 4 with excellent proximal filling. We retracted the proximal femur anteriorly.   We gained exposure to the acetabulum resecting any unneeded periacetabular soft tissue. We began reaming with a size 42 mm diameter reamer, reaming up to a size 48 mm diameter. We inserted a trial 48 mm Trident cup with good purchase. We therefore implanted the actual 48 mm Trident 2 Tritanium acetabular component with approximately 40 degrees of abduction and 25 degrees for forward flexion. The cup was fully seated. We had reasonable purchase, which we augmented with insertion of 2 dome screws, both with good purchase. We inserted a trial size D, 32 mm poly and resected osteophytes from around the margin of the cup particularly anteriorly. We applied our trial 132 degree neck and a 32+0 head and reduced the hip. This gave us excellent soft tissue tension. We had excellent stability with flexion to 90 degrees and internal rotation to about 90 degrees. We were also stable in extension and external rotation, as well as mid flexion, adduction, and internal rotation. We therefore, chose this combination of implants. We removed all the trials. We inserted the actual X-3 poly size D, 32 mm for the 48 mm Trident cup. We then implanted the actual Elaine Accolade 2 femoral component, size 4, 132 degree neck which was fully seated with good purchase. We then did a final trial reduction with a 32+0 head with excellent stability and soft tissue tension as previously described. We applied the actual femoral head component and reduced the hip. Then we copiously irrigated the wound. We closed the capsule posteriorly with #1 Ethibond figure of eight suture. We then repaired the short external rotators back to the posterior margins of the greater trochanter with drill holes through bone. We closed the tensor fascia with #1 Stratofix running suture. We closed the subcu layer with 2-0 Vicryl inverted suture, and the skin with a running 3-0 Monocryl subcuticular stitch. Steri-strips were applied to the skin.   Sterile dressing was applied with

## 2022-08-17 NOTE — ANESTHESIA PRE PROCEDURE
Department of Anesthesiology  Preprocedure Note       Name:  Vitaly Banks   Age:  81 y.o.  :  1940                                          MRN:  [id number]         Date:  2022      Surgeon: Lonna Frankel):  Wilber Pollard MD    Procedure: Procedure(s):  RIGHT TOTAL HIP ARTHROPLASTY ++WESTON++    Medications prior to admission:   Prior to Admission medications    Medication Sig Start Date End Date Taking? Authorizing Provider   metoprolol succinate (TOPROL XL) 100 MG extended release tablet Take 200 mg by mouth in the morning. Historical Provider, MD   irbesartan (AVAPRO) 75 MG tablet Take 75 mg by mouth in the morning. Historical Provider, MD   triamterene-hydroCHLOROthiazide (MAXZIDE-25) 37.5-25 MG per tablet Take 1 tablet by mouth in the morning. Historical Provider, MD   rOPINIRole (REQUIP) 1 MG tablet Take 1 mg by mouth every evening    Historical Provider, MD   traZODone (DESYREL) 50 MG tablet Take 50 mg by mouth nightly    Historical Provider, MD   traMADol (ULTRAM) 50 MG tablet Take 50 mg by mouth 2 times daily as needed for Pain.     Historical Provider, MD       Current medications:    Current Facility-Administered Medications   Medication Dose Route Frequency Provider Last Rate Last Admin    dexamethasone (PF) (DECADRON) injection 8 mg  8 mg IntraVENous Once [facility] [place]        sodium chloride flush 0.9 % injection 5-40 mL  5-40 mL IntraVENous 2 times per day [facility] [place]        sodium chloride flush 0.9 % injection 5-40 mL  5-40 mL IntraVENous PRN [facility] [place]        0.9 % sodium chloride infusion   IntraVENous PRN [facility] [place] [place]        0.9 % sodium chloride infusion   IntraVENous Continuous [facility] [place] [place]        ceFAZolin (ANCEF) 2,000 mg in sterile water 20 mL IV syringe  2,000 mg IntraVENous On Call to [facility] [place]        tranexamic acid (CYKLOKAPRON) irrigation 1,000 mg  1,000 mg Intra-artICUlar On Call to [address] [place] [address] Allergies:  No Known Allergies    Problem List:    Patient Active Problem List   Diagnosis Code    Primary osteoarthritis of right hip M16.11       Past Medical History:        Diagnosis Date    Arthritis     Hypertension        Past Surgical History:        Procedure Laterality Date    ANKLE SURGERY Right     [address]    HYSTERECTOMY (CERVIX STATUS UNKNOWN)      KIDNEY SURGERY Right     stent with failure       Social History:    Social History     Tobacco Use    Smoking status: Former     Types: Cigarettes     Quit date:      Years since quittin.6    Smokeless tobacco: Never   Substance Use Topics    Alcohol use: Yes     Comment: occasional wine                                Counseling given: Not Answered      Vital Signs (Current):   Vitals:    22 0809 22 0819   BP: 137/77    Pulse: 64    Resp: 16    Temp:  36.6 °C (97.9 °F)   TempSrc:  Temporal   SpO2: 96%    Weight:  204 lb (92.5 kg)   Height:  5' 3\" (1.6 m)                                              BP Readings from Last 3 Encounters:   22 137/77   22 134/63       NPO Status: Time of last liquid consumption: 0500 (sips of water with am meds)                        Time of last solid consumption:                         Date of last liquid consumption: 22                        Date of last solid food consumption: 22    BMI:   Wt Readings from Last 3 Encounters:   22 204 lb (92.5 kg)   22 204 lb (92.5 kg)     Body mass index is 36.14 kg/m².     CBC:   Lab Results   Component Value Date/Time    WBC 6.8 2022 11:50 AM    RBC 4.06 2022 11:50 AM    HGB 12.9 2022 11:50 AM    HCT 39.3 2022 11:50 AM    MCV 96.8 2022 11:50 AM    RDW 12.3 2022 11:50 AM     2022 11:50 AM       CMP:   Lab Results   Component Value Date/Time     2022 11:50 AM    K 3.9 2022 11:50 AM    CL 93 2022 11:50 AM CO2 27 08/12/2022 11:50 AM    BUN 26 08/12/2022 11:50 AM    CREATININE 1.1 08/12/2022 11:50 AM    GFRAA 58 08/12/2022 11:50 AM    LABGLOM 48 08/12/2022 11:50 AM    GLUCOSE 106 08/12/2022 11:50 AM    CALCIUM 9.9 08/12/2022 11:50 AM       POC Tests: No results for input(s): POCGLU, POCNA, POCK, POCCL, POCBUN, POCHEMO, POCHCT in the last 72 hours. Coags: No results found for: PROTIME, INR, APTT    HCG (If Applicable): No results found for: PREGTESTUR, PREGSERUM, HCG, HCGQUANT     ABGs: No results found for: PHART, PO2ART, ARO4JOC, PFT4JOQ, BEART, P2MZRZMY     Type & Screen (If Applicable):  No results found for: LABABO, LABRH    Drug/Infectious Status (If Applicable):  No results found for: HIV, HEPCAB    COVID-19 Screening (If Applicable): No results found for: COVID19        Anesthesia Evaluation  Patient summary reviewed and Nursing notes reviewed no history of anesthetic complications:   Airway: Mallampati: III  TM distance: >3 FB   Neck ROM: full  Mouth opening: > = 3 FB   Dental:    (+) implants      Pulmonary:normal exam  breath sounds clear to auscultation      (-) not a current smoker          Patient did not smoke on day of surgery. Cardiovascular:  Exercise tolerance: good (>4 METS),   (+) hypertension: moderate,         Rhythm: regular  Rate: normal           Beta Blocker:  Dose within 24 Hrs         Neuro/Psych:   Negative Neuro/Psych ROS              GI/Hepatic/Renal:   (+) morbid obesity          Endo/Other: Negative Endo/Other ROS   (+) : arthritis: OA., .                 Abdominal:             Vascular: negative vascular ROS. Other Findings:           Anesthesia Plan      spinal and general     ASA 3     (Discussed spinal with patient and GA. Questions answered. Patient agrees to spinal with GA as backup.)  Induction: intravenous. MIPS: Postoperative opioids intended and Prophylactic antiemetics administered.   Anesthetic plan and risks discussed with patient and spouse. Plan discussed with attending. CBC   Lab Results   Component Value Date/Time    WBC 6.8 08/12/2022 11:50 AM    RBC 4.06 08/12/2022 11:50 AM    HGB 12.9 08/12/2022 11:50 AM    HCT 39.3 08/12/2022 11:50 AM    MCV 96.8 08/12/2022 11:50 AM    RDW 12.3 08/12/2022 11:50 AM     08/12/2022 11:50 AM     CMP    Lab Results   Component Value Date/Time     08/12/2022 11:50 AM    K 3.9 08/12/2022 11:50 AM    CL 93 08/12/2022 11:50 AM    CO2 27 08/12/2022 11:50 AM    BUN 26 08/12/2022 11:50 AM    CREATININE 1.1 08/12/2022 11:50 AM    GFRAA 58 08/12/2022 11:50 AM    LABGLOM 48 08/12/2022 11:50 AM    GLUCOSE 106 08/12/2022 11:50 AM    CALCIUM 9.9 08/12/2022 11:50 AM     BMP    Lab Results   Component Value Date/Time     08/12/2022 11:50 AM    K 3.9 08/12/2022 11:50 AM    CL 93 08/12/2022 11:50 AM    CO2 27 08/12/2022 11:50 AM    BUN 26 08/12/2022 11:50 AM    CREATININE 1.1 08/12/2022 11:50 AM    CALCIUM 9.9 08/12/2022 11:50 AM    GFRAA 58 08/12/2022 11:50 AM    LABGLOM 48 08/12/2022 11:50 AM    GLUCOSE 106 08/12/2022 11:50 AM     POCGlucose  No results for input(s): GLUCOSE in the last 72 hours.      Coags  No results found for: PROTIME, INR, APTT    HCG (If Applicable) No results found for: PREGTESTUR, PREGSERUM, HCG, HCGQUANT     ABGs   No results found for: PHART, PO2ART, WXP8IWK, BBP1QZB, BEART, A7FJWDGP     Type & Screen (If Applicable)  No results found for: [facility]  Active Problem List with ICD10 Codes  Patient Active Problem List   Diagnosis Code    Primary osteoarthritis of right hip M16.11       Orlando Kee, APRN - [facility]  August 17, 2022  8:55 AM    Orlando Kee, SHIRLEY - RADHIKA   8/17/2022

## 2022-08-17 NOTE — PROGRESS NOTES
Physical Therapy  Facility/Department: [facility] SURGERY  Physical Therapy Initial Assessment    Name: Kevin Mc  : 1940  MRN: [id number]  Date of Service: 2022          Patient Diagnosis(es): The primary encounter diagnosis was Primary osteoarthritis of right hip. A diagnosis of Unilateral primary osteoarthritis, right hip was also pertinent to this visit. Past Medical History:  has a past medical history of Arthritis and Hypertension. Past Surgical History:  has a past surgical history that includes Ankle surgery (Right, ); Kidney surgery (Right, ); Hysterectomy (); and Total hip arthroplasty (Right, 2022). Requires PT Follow-Up: Yes     Evaluating Therapist: Ayde Hidalgo PT     Rec ww     Referring Provider:  Sarahy Mcdonnell MD    PT order : PT eval and treat     Room #: 038   DIAGNOSIS:  OA s/p R CHRISTINA    PRECAUTIONS: falls, posterolateral hip, FWBAT     Social:  Pt lives with  spouse  in a  1  floor plan  1+1  steps to enter. Prior to admission pt walked with cane      Initial Evaluation  Date:  2022  Treatment      Short Term/ Long Term   Goals   Was pt agreeable to Eval/treatment? Yes       Does pt have pain? R hip      Bed Mobility  Rolling:  NT   Supine to sit: mod/max assist   Sit to supine:  NT   Scooting:  mod assist in sit    SBA    Transfers Sit to stand:  mod assist   Stand to sit: min assist   Stand pivot: NT    SBA    Ambulation     40  feet with ww  with  CGA/min assist    150 -200  feet with  ww  with  SBA        Stair negotiation: ascended and descended NT    4  steps with  B  rail with  SBA /CGA    LE ROM  Decreased throughout hip, distally WFL      LE strength  2- to 3-/ 5 R hip , distally 4-/ 5      AM- PAC RAW score  14/ 24            Pt is alert and Oriented x  3      Balance:  CGA/min assist with gait .  Fall risk due to  weakness, pain, decreased balance   Endurance: WFL   Bed/Chair alarm:  no      ASSESSMENT  Pt displays mobility   [x] ROM to improve independence with functional mobility   [x] Balance Training to improve static/dynamic balance and to reduce fall risk  [x] Endurance Training to improve activity tolerance during functional mobility   [x] Transfer Training to improve safety and independence with all functional transfers   [x] Gait Training to improve gait mechanics, endurance and assess need for appropriate assistive device  [x] Stair Training in preparation for safe discharge home and/or into the community   [x] Positioning to prevent skin breakdown and contractures  [x] Safety and Education Training   [x] Patient/Caregiver Education   [] HEP  [] Other     Frequency of treatments will be BID  x 2 days. Time in: 1440   Time out:  1507       Evaluation Time includes thorough review of current medical information, gathering information on past medical history/social history and prior level of function, completion of standardized testing/informal observation of tasks, assessment of data and education on plan of care and goals.     CPT codes:  [x] Low Complexity PT evaluation 95690  [] Moderate Complexity PT evaluation 25255  [] High Complexity PT evaluation 63353  [] PT Re-evaluation 12560  [] Gait training 06818  minutes  [x] Therapeutic activities 91115 10 minutes  [] Therapeutic exercises 50803  minutes  [] Neuromuscular reeducation 59275  minutes       Rabia 18 number:  PT [id number]

## 2022-08-17 NOTE — PROGRESS NOTES
Occupational Therapy    OCCUPATIONAL THERAPY INITIAL EVALUATION     Flory Mckeon [facility], [place]         [id number]                                                  Patient Name: Galdino Aaron    MRN: [id number]    : 1940    Room: [address] [place] [address]      Evaluating OT: Destiney Wei OTR/L   [id number]      Referring Nichol Mackey MD    Specific Provider Orders/Date:OT eval and treat 2022      Diagnosis:  Unilateral primary osteoarthritis, right hip [M16.11]  Primary osteoarthritis of right hip [M16.11]    Surgery: R CHRISTINA 2022     Pertinent Medical History: HTN, arthritis     Precautions:  Fall Risk, WBAT R LE, posterolateral hip precautions      Assessment of current deficits    [x] Functional mobility  [x]ADLs  [x] Strength               []Cognition    [x] Functional transfers   [x] IADLs         [x] Safety Awareness   [x]Endurance    [] Fine Coordination              [x] Balance      [] Vision/perception   []Sensation     []Gross Motor Coordination  [] ROM  [] Delirium                   [] Motor Control     OT PLAN OF CARE   OT POC based on physician orders, patient diagnosis and results of clinical assessment    Frequency/Duration  2-4 days/wk for 2 weeks PRN   Specific OT Treatment Interventions to include:   ADL retraining/adapted techniques and AE recommendations to increase functional independence within precautions                    Energy conservation techniques to improve tolerance for selfcare routine   Functional transfer/mobility training/DME recommendations for increased independence, safety and fall prevention         Patient/family education to increase safety and functional independence             Environmental modifications for safe mobility and completion of ADLs                             Therapeutic activity to improve functional performance during ADLs. Therapeutic exercise to improve tolerance and functional strength for ADLs    Balance retraining/tolerance tasks for facilitation of postural control with dynamic challenges during ADLs .       Positioning to improve functional independence      Recommended Adaptive Equipment: wheeled walker, BSC, lower body AE     Home Living: Pt lives with , ranch with 1 step   Bathroom setup: walk in shower      Prior Level of Function: Independent  with ADLs , assist as needed  with IADLs; ambulated with cane      Pain Level: no pain this session ;   Cognition: A&O: pleasant , following commands and conversing    Memory:  fair+   Sequencing:  fair +   Problem solving:  fair+   Judgement/safety:  fair +     Functional Assessment:  AM-PAC Daily Activity Raw Score: 16/24   Initial Eval Status  Date: 8/17/22 Treatment Status  Date: STGs = LTGs  Time frame: 10-14 days   Feeding Independent      Grooming SBA/set-up   supervision   UB Dressing Min A  Supervision    LB Dressing Max A  Assist with threading brief over feet and pulling up over hips     Educated patient on hip precautions and dressing tech - need reinforcement   SBA    Bathing Mod A   SBA    Toileting Mod A  Supervision    Bed Mobility  Upon entry sitting EOB    SBA   Functional Transfers Mod A  Sit - stand from bed   Supervision    Functional Mobility Min A,w/walker   Ambulating in room   Supervision  with good tolerance    Balance Sitting:     Static:  Independent     Dynamic:Mod A   Standing: Min /CGA   Standing- supervision    Activity Tolerance No SOB     Educated and instructed on incentive spirometer - patient able to demonstrate   Good  with ADL activity    Visual/  Perceptual Glasses: none by bedside                 Hand Dominance right   AROM (PROM) Strength Additional Info:    RUE  WFL WFL good  and wfl FMC/dexterity noted during ADL tasks       LUE WFL WFL good  and wfl FMC/dexterity noted during ADL tasks       Hearing: WFL   Sensation:  No c/o numbness or tingling   Tone: WFL   Edema: none observed     Comments: Upon arrival patient lying in bed . At end of session, patient sitting in chair with call light and phone within reach, all lines and tubes intact. Family present in room     Overall patient demonstrated  decreased independence and safety during completion of ADL/functional transfer/mobility tasks. Pt would benefit from continued skilled OT to increase safety and independence with completion of ADL/IADL tasks for functional independence and quality of life. Rehab Potential: good for established goals     Patient / Family Goal: return home      Patient and/or family were instructed on functional diagnosis, prognosis/goals and OT plan of care. Demonstrated good  understanding. Eval Complexity: Low    Time In: 1450  Time Out: 1507      Min Units   OT Eval Low 97165 x  1   OT Eval Medium 31231      OT Eval High 76335      OT Re-Eval [id number]       Therapeutic Ex 74266      Therapeutic Activities 39993       ADL/Self Care 40210       Orthotic Management 83372       Manual 61572     Neuro Re-Ed 63046       Non-Billable Time          Evaluation Time additionally includes thorough review of current medical information, gathering information on past medical history/social history and prior level of function, interpretation of standardized testing/informal observation of tasks, assessment of data and development of plan of care and goals.             Ji Ibarra  OTR/L  OT [id number]

## 2022-08-17 NOTE — PROGRESS NOTES
Database initiated pharmacy and medications verified with the patient. She is A&O from home with . She normally uses a cane for ambulation and is RA at baseline. She normally isn't hard of hearing but after the procedure she has been having difficulty hearing.

## 2022-08-18 VITALS
TEMPERATURE: 97.5 F | HEART RATE: 84 BPM | DIASTOLIC BLOOD PRESSURE: 63 MMHG | SYSTOLIC BLOOD PRESSURE: 119 MMHG | BODY MASS INDEX: 36.14 KG/M2 | WEIGHT: 204 LBS | RESPIRATION RATE: 18 BRPM | HEIGHT: 63 IN | OXYGEN SATURATION: 96 %

## 2022-08-18 LAB
HCT VFR BLD CALC: 28.3 % (ref 34–48)
HEMOGLOBIN: 9.2 G/DL (ref 11.5–15.5)

## 2022-08-18 PROCEDURE — 85018 HEMOGLOBIN: CPT

## 2022-08-18 PROCEDURE — G0378 HOSPITAL OBSERVATION PER HR: HCPCS

## 2022-08-18 PROCEDURE — 51798 US URINE CAPACITY MEASURE: CPT

## 2022-08-18 PROCEDURE — 85014 HEMATOCRIT: CPT

## 2022-08-18 PROCEDURE — 36415 COLL VENOUS BLD VENIPUNCTURE: CPT

## 2022-08-18 PROCEDURE — 97535 SELF CARE MNGMENT TRAINING: CPT

## 2022-08-18 PROCEDURE — 6360000002 HC RX W HCPCS: Performed by: ORTHOPAEDIC SURGERY

## 2022-08-18 PROCEDURE — 97530 THERAPEUTIC ACTIVITIES: CPT

## 2022-08-18 PROCEDURE — 2580000003 HC RX 258: Performed by: ORTHOPAEDIC SURGERY

## 2022-08-18 PROCEDURE — 6370000000 HC RX 637 (ALT 250 FOR IP): Performed by: ORTHOPAEDIC SURGERY

## 2022-08-18 RX ORDER — HYDROCODONE BITARTRATE AND ACETAMINOPHEN 5; 325 MG/1; MG/1
1-2 TABLET ORAL EVERY 4 HOURS PRN
Qty: 60 TABLET | Refills: 0 | Status: SHIPPED | OUTPATIENT
Start: 2022-08-18 | End: 2022-08-25

## 2022-08-18 RX ADMIN — HYDROCODONE BITARTRATE AND ACETAMINOPHEN 2 TABLET: 5; 325 TABLET ORAL at 02:17

## 2022-08-18 RX ADMIN — SODIUM CHLORIDE: 9 INJECTION, SOLUTION INTRAVENOUS at 06:10

## 2022-08-18 RX ADMIN — HYDROCODONE BITARTRATE AND ACETAMINOPHEN 1 TABLET: 5; 325 TABLET ORAL at 06:20

## 2022-08-18 RX ADMIN — DEXAMETHASONE SODIUM PHOSPHATE 10 MG: 10 INJECTION INTRAMUSCULAR; INTRAVENOUS at 10:24

## 2022-08-18 RX ADMIN — CEFAZOLIN 2000 MG: 2 INJECTION, POWDER, FOR SOLUTION INTRAMUSCULAR; INTRAVENOUS at 06:08

## 2022-08-18 RX ADMIN — SODIUM CHLORIDE, PRESERVATIVE FREE 20 ML: 5 INJECTION INTRAVENOUS at 10:24

## 2022-08-18 RX ADMIN — ASPIRIN 325 MG: 325 TABLET, COATED ORAL at 10:23

## 2022-08-18 ASSESSMENT — PAIN DESCRIPTION - DESCRIPTORS
DESCRIPTORS: ACHING
DESCRIPTORS: ACHING;DISCOMFORT
DESCRIPTORS: ACHING;DISCOMFORT
DESCRIPTORS: ACHING

## 2022-08-18 ASSESSMENT — PAIN DESCRIPTION - ORIENTATION
ORIENTATION: RIGHT

## 2022-08-18 ASSESSMENT — PAIN SCALES - GENERAL
PAINLEVEL_OUTOF10: 6
PAINLEVEL_OUTOF10: 8
PAINLEVEL_OUTOF10: 7

## 2022-08-18 ASSESSMENT — PAIN DESCRIPTION - LOCATION
LOCATION: HIP

## 2022-08-18 ASSESSMENT — PAIN - FUNCTIONAL ASSESSMENT
PAIN_FUNCTIONAL_ASSESSMENT: PREVENTS OR INTERFERES SOME ACTIVE ACTIVITIES AND ADLS
PAIN_FUNCTIONAL_ASSESSMENT: PREVENTS OR INTERFERES SOME ACTIVE ACTIVITIES AND ADLS

## 2022-08-18 NOTE — CARE COORDINATION
P Quality Flow/Interdisciplinary Rounds Progress Note        Quality Flow Rounds held on August 18, 2022    Disciplines Attending:  Bedside Nurse, , , and Nursing Unit Leadership    Michela Genao was admitted on 8/17/2022  6:47 AM    Anticipated Discharge Date:       Disposition:    Eddie Score:  Eddie Scale Score: 19    Readmission Risk              Risk of Unplanned Readmission:  0           Discussed patient goal for the day, patient clinical progression, and barriers to discharge.   The following Goal(s) of the Day/Commitment(s) have been identified:   Discharge Lilian stephany Danielson RN  August 18, 2022

## 2022-08-18 NOTE — PLAN OF CARE
Problem: Discharge Planning  Goal: Discharge to home or other facility with appropriate resources  8/18/2022 0044 by Alexandra Borges RN  Outcome: Progressing     Problem: Pain  Goal: Verbalizes/displays adequate comfort level or baseline comfort level  8/18/2022 0044 by Alexandra Borges RN  Outcome: Progressing     Problem: Safety - Adult  Goal: Free from fall injury  8/18/2022 0044 by Alexandra Borges RN  Outcome: Progressing

## 2022-08-18 NOTE — PROGRESS NOTES
Occupational Therapy  OT BEDSIDE TREATMENT NOTE      Date:2022  Patient Name: Memo Cheatham  MRN: [id number]  : 1940  Room: [address] [place], [place] [address]         Evaluating OT: Mateusz Meier OTR/L   [id number]       Referring Tim Howell MD    Specific Provider Orders/Date:OT eval and treat 2022       Diagnosis:  Unilateral primary osteoarthritis, right hip [M16.11]  Primary osteoarthritis of right hip [M16.11]    Surgery: R CHRISTINA 2022     Pertinent Medical History: HTN, arthritis     Precautions:  Fall Risk, WBAT R LE, posterolateral hip precautions       Assessment of current deficits   [x] Functional mobility            [x]ADLs           [x] Strength                  []Cognition   [x] Functional transfers          [x] IADLs         [x] Safety Awareness   [x]Endurance   [] Fine Coordination                         [x] Balance      [] Vision/perception   []Sensation      []Gross Motor Coordination             [] ROM           [] Delirium                   [] Motor Control     OT PLAN OF CARE   OT POC based on physician orders, patient diagnosis and results of clinical assessment     Frequency/Duration  2-4 days/wk for 2 weeks PRN  Specific OT Treatment Interventions to include:   ADL retraining/adapted techniques and AE recommendations to increase functional independence within precautions                    Energy conservation techniques to improve tolerance for selfcare routine  Functional transfer/mobility training/DME recommendations for increased independence, safety and fall prevention         Patient/family education to increase safety and functional independence             Environmental modifications for safe mobility and completion of ADLs                             Therapeutic activity to improve functional performance during ADLs.                                          Therapeutic exercise to improve tolerance and functional strength for ADLs   Balance retraining/tolerance tasks for facilitation of postural control with dynamic challenges during ADLs . Positioning to improve functional independence        Recommended Adaptive Equipment: wheeled walker, BSC     Home Living: Pt lives with , ranch with 1 step   Bathroom setup: walk in shower       Prior Level of Function: Independent  with ADLs , assist as needed  with IADLs; ambulated with cane        Pain Level: hip discomfort, patient states she was medicated prior to session  Cognition: A&O: pleasant , following commands and conversing               Memory:  fair+              Sequencing:  fair +              Problem solving:  fair+              Judgement/safety:  fair +                Functional Assessment:  AM-PAC Daily Activity Raw Score: 18/24    Initial Eval Status  Date: 8/17/22 Treatment Status  Date: 8/18/22 STGs = LTGs  Time frame: 10-14 days   Feeding Independent        Grooming SBA/set-up SBA standing at sink  supervision   UB Dressing Min A  SBA to arrange gown Supervision    LB Dressing Max A  Assist with threading brief over feet and pulling up over hips     Educated patient on hip precautions and dressing tech - need reinforcement  SBA to don pants, brief, and socks after education on LB AE and hip precautions. Increased time required but good carry over of instruction, min v/c to maintain hip precautions. Hip kit and compression stocking aide distributed. Good understanding of compression stocking aide demonstrated,  able to assist SBA    Bathing Mod A   SBA    Toileting Mod A  SBA on BSC over toilet after instruction x2 reps Supervision    Bed Mobility  Upon entry sitting EOB   Supine to sit min A for RLE support, increased time required for good carry over of instruction. SBA   Functional Transfers Mod A  Sit - stand from bed Sit <> stand SBA with min v/c for hand placement from chair, EOB/Mat, and BSC, improvement noted with progression of session.       Walk in shower transfer CGA after instruction on technique Supervision    Functional Mobility Min A,w/walker  Ambulating in room  SBA with WW in room and worrell. Supervision  with good tolerance   Balance Sitting:    Static:  Independent     Dynamic:Mod A   Standing: Min /CGA  standing balance supervision during ADL Standing- supervision    Activity Tolerance No SOB     Educated and instructed on incentive spirometer - patient able to demonstrate  fair+, patient fatigued with hip discomfort but good effort and participation Good  with ADL activity     Comments:  patient pleasant and agreeable to session, very motivated with good participation demonstrated. Good understanding of instruction demonstrated with good carry over of hip precautions and ADL training. Increased time required due to pain and fatigue but improvement demonstrated throughout session. Patient in chair with call light in reach at the end of the session. Education/treatment: ADL and functional transfer/activity performed to increase safety and independence during self care tasks. Education provided on safety awareness, adl reeducation, functional transfer training, AE, hip precautions    Pt has made progress towards set goals.      Time In: 8:20   Time Out: 9:43     Min Units   Therapeutic Ex [id number]     Therapeutic Activities 47636     ADL/Self Care 68687 78 6   Orthotic Management 57291     Neuro Re-Ed 69318     Non-Billable Time     TOTAL TIMED TREATMENT [address] MEDRANO/L [address]

## 2022-08-18 NOTE — PROGRESS NOTES
Physical Therapy  Facility/Department: [address] ORTHO SURGERY  Daily Treatment Note  NAME: Mellissa Correa  : 1940  MRN: [id number]    Date of Service: 2022      Patient Diagnosis(es): The primary encounter diagnosis was Primary osteoarthritis of right hip. A diagnosis of Unilateral primary osteoarthritis, right hip was also pertinent to this visit. Past Medical History:  has a past medical history of Arthritis and Hypertension. Past Surgical History:  has a past surgical history that includes Ankle surgery (Right, ); Kidney surgery (Right, ); Hysterectomy (); and Total hip arthroplasty (Right, 2022). Requires PT Follow-Up: Yes      Evaluating Therapist: Fredy Haines, PT      Rec ww      Referring Provider:  Mimi Munoz MD     PT order : PT eval and treat     Room #: 301   DIAGNOSIS:  OA s/p R CHRISTINA    PRECAUTIONS: falls, posterolateral hip, FWBAT      Social:  Pt lives with  spouse  in a  1  floor plan  1+1  steps to enter. Prior to admission pt walked with cane        Initial Evaluation  Date:  2022 Treatment    2022   Short Term/ Long Term   Goals   Was pt agreeable to Eval/treatment? Yes    yes      Does pt have pain?  R hip   R hip      Bed Mobility Rolling:  NT   Supine to sit: mod/max assist   Sit to supine:  NT   Scooting:  mod assist in sit  sit to supine : mod assist   SBA   Transfers Sit to stand:  mod assist   Stand to sit: min assist   Stand pivot: NT  sit <> stand : SBA /CGA  SBA   Ambulation     40  feet with ww  with  CGA/min assist  15 and 140 feet x 2 with ww SBA   150 -200  feet with  ww  with  SBA          Stair negotiation: ascended and descended NT  4 steps  with B HRs CGA   4 steps with 1 HR CGA   4  steps with  B  rail with  SBA /CGA    LE ROM  Decreased throughout hip, distally WFL        LE strength  2- to 3-/ 5 R hip , distally 4-/ 5        AM- PAC RAW score                 Pt is alert and Oriented x  3       Balance: SBA with gait . Fall risk due to  weakness, pain, decreased balance   Endurance: WFL   Bed/Chair alarm:  no      ASSESSMENT    Pt displays functional ability as noted in the objective portion of this treatment         Conditions Requiring Skilled Therapeutic Intervention:     [x]Decreased strength                                      [x]Decreased ROM  [x]Decreased functional mobility  [x]Decreased balance  [x]Decreased endurance  [x]Decreased posture  []Decreased sensation  []Decreased coordination  []Decreased vision  []Decreased safety awareness  [x]Increased pain                Treatment/Education:    Pt in chair  upon arrival ; agreeable to PT. Pt was able to recall hip precautions prior to mobility. Mobility as above. Pt reports her spouse is able to assist.  Instructed on hand and foot placement with transfers and ww safety and proper use with gait. Pt with slow nate. Performed reciprocal gait after instruction. Instructed in sequencing with steps and she reports and displays understanding as this is how she was doing steps prior. Instructed pt on technique with sequencing with bed mobility; needed assist with B LEs getting B LEs into bed. Pt educated on fall risk,  safe and proper technique with mobility         Patient response to education:  Pt verbalized understanding Pt demonstrated skill Pt requires further education in this area   x  x with cues/assist  x         Comments:  Pt left  in bed  after session, with call light in reach. PLAN    PT care will be provided in accordance with the objectives noted above. Whenever appropriate, clear delegation orders will be provided for nursing staff. Exercises and functional mobility practice will be used as well as appropriate assistive devices or modalities to obtain goals. Patient and family education will also be administered as needed.            PLAN OF CARE:     Current Treatment Recommendations     [x] Strengthening to improve independence with functional mobility  [x] ROM to improve independence with functional mobility  [x] Balance Training to improve static/dynamic balance and to reduce fall risk  [x] Endurance Training to improve activity tolerance during functional mobility  [x] Transfer Training to improve safety and independence with all functional transfers  [x] Gait Training to improve gait mechanics, endurance and assess need for appropriate assistive device  [x] Stair Training in preparation for safe discharge home and/or into the community  [x] Positioning to prevent skin breakdown and contractures  [x] Safety and Education Training  [x] Patient/Caregiver Education  [] HEP  [] Other     Frequency of treatments will be BID  x 2 days.      Time in: 0940  Time out:  1026        Plan is for discharge home this PM with family assist . Pt has grossly met all goals except for bed mobility     CPT codes:  [] Low Complexity PT evaluation 62069  [] Moderate Complexity PT evaluation 57593  [] High Complexity PT evaluation 64949  [] PT Re-evaluation 42238  [] Gait training 38127  minutes  [x] Therapeutic activities 15650 46 minutes  [] Therapeutic exercises 42098  minutes  [] Neuromuscular reeducation 92292  minutes        Rabia 18 number:  PT 7935

## 2022-08-18 NOTE — PROGRESS NOTES
Discharge instructions given to patient, and daughter with understanding verbalized.  All questions answered

## 2022-08-18 NOTE — PROGRESS NOTES
Total Joint    Post op Day  1  Right CHRISTINA      Acute post-op blood loss anemia      S:  Complaints: pain, mod this am    O:  Afebrile, Vital signs stable     Dressing Intact   Full range of motion right ankle and toes   Sensation intact to light touch     H/H:   Recent Labs     08/18/22  0430   HGB 9.2*   HCT 28.3*        PT/INR: No results for input(s): PROT, INR in the last 72 hours. Xray:  stable implants    A/P:  Stable   Acute post-op blood loss anemia-stable   Proceed with Physical Therapy              D/C Morgan catheter              Heplock IV's if PO intake is adequate              Cont ASA for DVT prophylaxis   Evaluate for Home Discharge versus Rehab   Home health care needed secondary to unsteady gait, walker for ambulation, and need for home physical therapy.

## 2022-08-18 NOTE — PROGRESS NOTES
Physical Therapy  PT treatment attempted in PM. Pt reports she is ready for discharge and did not want a PT session. Family members present, all questions answered to best of my ability. Instructed family pt will need assist with bed mobility and CGA on stairs.

## 2022-08-23 NOTE — DISCHARGE SUMMARY
Physician Discharge Summary     Patient ID:  Ita Rothman  [id number]  24 y.o.  1940    Admit date: 8/17/2022    Discharge date and time: 8/18/2022  3:22 PM     Admitting Physician: Heron Garcia MD     Surgeon: Heron Garcia M.D. Assistant: MICHELLE Boogie PA-C    Anesthesia: general    Discharge Physician: Dr. Heron Garcia    Admission Diagnoses: Unilateral primary osteoarthritis, right hip [M16.11]  Primary osteoarthritis of right hip [M16.11]    Discharge Diagnoses: right Total Hip Arthroplasty      Discharged Condition: stable    Hospital Course:               POD#1 stable, VSS, Doing well, NVSI, ROM ok, Dsg c/d/I, labs stable, PT initiated.  Pt stable for home d/c                          Consults: PCP    Significant Diagnostic Studies:     Recent Labs     08/18/22  0430   HGB 9.2*   HCT 28.3*      Xray: stable implants  Treatments: Standard post-op    Disposition: home    Patient Instructions: May shower upon hosp d/c. Leave dsg intact x 7 days    D/C Meds: Oxycodone and ASA    Activity: activity as tolerated    Diet: regular diet    Wound Care: keep wound clean and dry    Follow-up in the office at 3 weeks post-op    Signed:  Cecy White  8/23/2022  9:07 AM

## 2022-09-13 ASSESSMENT — PROMIS GLOBAL HEALTH SCALE
IN GENERAL, HOW WOULD YOU RATE YOUR MENTAL HEALTH, INCLUDING YOUR MOOD AND YOUR ABILITY TO THINK [ON A SCALE OF 1 (POOR) TO 5 (EXCELLENT)]?: 3
IN GENERAL, PLEASE RATE HOW WELL YOU CARRY OUT YOUR USUAL SOCIAL ACTIVITIES (INCLUDES ACTIVITIES AT HOME, AT WORK, AND IN YOUR COMMUNITY, AND RESPONSIBILITIES AS A PARENT, CHILD, SPOUSE, EMPLOYEE, FRIEND, ETC) [ON A SCALE OF 1 (POOR) TO 5 (EXCELLENT)]?: 2
IN THE PAST 7 DAYS, HOW OFTEN HAVE YOU BEEN BOTHERED BY EMOTIONAL PROBLEMS, SUCH AS FEELING ANXIOUS, DEPRESSED, OR IRRITABLE [ON A SCALE FROM 1 (NEVER) TO 5 (ALWAYS)]?: 3
IN GENERAL, HOW WOULD YOU RATE YOUR SATISFACTION WITH YOUR SOCIAL ACTIVITIES AND RELATIONSHIPS [ON A SCALE OF 1 (POOR) TO 5 (EXCELLENT)]?: 2
IN GENERAL, WOULD YOU SAY YOUR HEALTH IS...[ON A SCALE OF 1 (POOR) TO 5 (EXCELLENT)]: 4
SUM OF RESPONSES TO QUESTIONS 2, 4, 5, & 10: 12
HOW IS THE PROMIS V1.1 BEING ADMINISTERED?: 2
TO WHAT EXTENT ARE YOU ABLE TO CARRY OUT YOUR EVERYDAY PHYSICAL ACTIVITIES SUCH AS WALKING, CLIMBING STAIRS, CARRYING GROCERIES, OR MOVING A CHAIR [ON A SCALE OF 1 (NOT AT ALL) TO 5 (COMPLETELY)]?: 3
IN GENERAL, WOULD YOU SAY YOUR QUALITY OF LIFE IS...[ON A SCALE OF 1 (POOR) TO 5 (EXCELLENT)]: 4
IN THE PAST 7 DAYS, HOW WOULD YOU RATE YOUR PAIN ON AVERAGE [ON A SCALE FROM 0 (NO PAIN) TO 10 (WORST IMAGINABLE PAIN)]?: 5
IN GENERAL, HOW WOULD YOU RATE YOUR PHYSICAL HEALTH [ON A SCALE OF 1 (POOR) TO 5 (EXCELLENT)]?: 2
SUM OF RESPONSES TO QUESTIONS 3, 6, 7, & 8: 13
IN THE PAST 7 DAYS, HOW WOULD YOU RATE YOUR FATIGUE ON AVERAGE [ON A SCALE FROM 1 (NONE) TO 5 (VERY SEVERE)]?: 3

## 2022-09-13 ASSESSMENT — HOOS JR
LYING IN BED (TURNING OVER, MAINTAINING HIP POSITION): 2
BENDING TO THE FLOOR TO PICK UP OBJECT: 1
RISING FROM SITTING: 0
SITTING: 1
WALKING ON UNEVEN SURFACE: 1
GOING UP OR DOWN STAIRS: 0
HOOS JR RAW SCORE: 5
HOOS JR TOTAL INTERVAL SCORE: 73.472
HOOS JR RAW SCORE: 5

## 2023-08-31 ENCOUNTER — APPOINTMENT (RX ONLY)
Dept: URBAN - METROPOLITAN AREA CLINIC 12 | Facility: CLINIC | Age: 83
Setting detail: DERMATOLOGY
End: 2023-08-31

## 2023-08-31 DIAGNOSIS — M79.3 PANNICULITIS, UNSPECIFIED: ICD-10-CM | Status: INADEQUATELY CONTROLLED

## 2023-08-31 DIAGNOSIS — L57.0 ACTINIC KERATOSIS: ICD-10-CM

## 2023-08-31 DIAGNOSIS — L98.8 OTHER SPECIFIED DISORDERS OF THE SKIN AND SUBCUTANEOUS TISSUE: ICD-10-CM

## 2023-08-31 DIAGNOSIS — I87.2 VENOUS INSUFFICIENCY (CHRONIC) (PERIPHERAL): ICD-10-CM | Status: INADEQUATELY CONTROLLED

## 2023-08-31 PROBLEM — I83.12 VARICOSE VEINS OF LEFT LOWER EXTREMITY WITH INFLAMMATION: Status: ACTIVE | Noted: 2023-08-31

## 2023-08-31 PROCEDURE — ? PRESCRIPTION MEDICATION MANAGEMENT

## 2023-08-31 PROCEDURE — 99204 OFFICE O/P NEW MOD 45 MIN: CPT | Mod: 25

## 2023-08-31 PROCEDURE — ? LIQUID NITROGEN

## 2023-08-31 PROCEDURE — 17000 DESTRUCT PREMALG LESION: CPT

## 2023-08-31 PROCEDURE — ? PRESCRIPTION

## 2023-08-31 PROCEDURE — ? COUNSELING

## 2023-08-31 RX ORDER — CLOTRIMAZOLE AND BETAMETHASONE DIPROPIONATE 10; .5 MG/G; MG/G
CREAM TOPICAL
Qty: 45 | Refills: 3 | Status: ERX | COMMUNITY
Start: 2023-08-31

## 2023-08-31 RX ADMIN — CLOTRIMAZOLE AND BETAMETHASONE DIPROPIONATE: 10; .5 CREAM TOPICAL at 00:00

## 2023-08-31 ASSESSMENT — LOCATION DETAILED DESCRIPTION DERM
LOCATION DETAILED: LEFT DISTAL PRETIBIAL REGION
LOCATION DETAILED: RIGHT SUPERIOR MEDIAL BUCCAL CHEEK
LOCATION DETAILED: RIGHT MEDIAL MALAR CHEEK
LOCATION DETAILED: RIGHT DISTAL PRETIBIAL REGION
LOCATION DETAILED: LEFT DISTAL PRETIBIAL REGION
LOCATION DETAILED: RIGHT UPPER CUTANEOUS LIP

## 2023-08-31 ASSESSMENT — LOCATION SIMPLE DESCRIPTION DERM
LOCATION SIMPLE: LEFT PRETIBIAL REGION
LOCATION SIMPLE: RIGHT LIP
LOCATION SIMPLE: LEFT PRETIBIAL REGION
LOCATION SIMPLE: RIGHT CHEEK
LOCATION SIMPLE: RIGHT PRETIBIAL REGION

## 2023-08-31 ASSESSMENT — LOCATION ZONE DERM
LOCATION ZONE: LEG
LOCATION ZONE: FACE
LOCATION ZONE: LIP
LOCATION ZONE: LEG

## 2023-08-31 NOTE — PROCEDURE: PRESCRIPTION MEDICATION MANAGEMENT
Initiate Treatment: Clotrimazole- betamethasone cream m - f
Render In Strict Bullet Format?: No
Detail Level: Zone

## 2023-08-31 NOTE — PROCEDURE: LIQUID NITROGEN
Consent: The patient's verbal and/or written consent was obtained including but not limited to risks of crusting, scabbing, blistering, scarring, darker or lighter pigmentary change, recurrence, incomplete removal and infection.
Post-Care Instructions: I reviewed with the patient in detail post-care instructions. Patient is to wear sunprotection, and avoid picking at any of the treated lesions. Pt may apply Vaseline to crusted or scabbing areas.
Show Applicator Variable?: Yes
Number Of Freeze-Thaw Cycles: 2 freeze-thaw cycles
Duration Of Freeze Thaw-Cycle (Seconds): 6
Detail Level: Detailed
Render Note In Bullet Format When Appropriate: No

## 2023-08-31 NOTE — HPI: EVALUATION OF SKIN LESION(S)
What Type Of Note Output Would You Prefer (Optional)?: Bullet Format
Hpi Title: Evaluation of Skin Lesions
How Severe Are Your Spot(S)?: mild
Have Your Spot(S) Been Treated In The Past?: has not been treated
Additional History: Declines FBE \\n\\nPT- concerned with scaly patches on legs (prescribed a medication from pcp which helps with scaliness ) \\n\\nPT- concerned with lesions on face

## 2024-08-08 ENCOUNTER — APPOINTMENT (RX ONLY)
Dept: URBAN - METROPOLITAN AREA CLINIC 12 | Facility: CLINIC | Age: 84
Setting detail: DERMATOLOGY
End: 2024-08-08

## 2024-08-08 DIAGNOSIS — I87.2 VENOUS INSUFFICIENCY (CHRONIC) (PERIPHERAL): ICD-10-CM | Status: IMPROVED

## 2024-08-08 PROCEDURE — ? PRESCRIPTION

## 2024-08-08 PROCEDURE — ? MDM - TREATMENT GOALS

## 2024-08-08 PROCEDURE — ? TREATMENT REGIMEN

## 2024-08-08 PROCEDURE — ? COUNSELING

## 2024-08-08 PROCEDURE — 99214 OFFICE O/P EST MOD 30 MIN: CPT

## 2024-08-08 RX ORDER — MUPIROCIN 20 MG/G
OINTMENT TOPICAL
Qty: 22 | Refills: 3 | Status: ERX | COMMUNITY
Start: 2024-08-08

## 2024-08-08 RX ORDER — CLOTRIMAZOLE AND BETAMETHASONE DIPROPIONATE 10; .5 MG/G; MG/G
CREAM TOPICAL
Qty: 45 | Refills: 3 | Status: ERX

## 2024-08-08 RX ORDER — KETOCONAZOLE 20 MG/G
CREAM TOPICAL
Qty: 60 | Refills: 4 | Status: ERX | COMMUNITY
Start: 2024-08-08

## 2024-08-08 RX ORDER — AMMONIUM LACTATE 12 G/100G
CREAM TOPICAL
Qty: 385 | Refills: 4 | Status: ERX | COMMUNITY
Start: 2024-08-08

## 2024-08-08 RX ADMIN — KETOCONAZOLE: 20 CREAM TOPICAL at 00:00

## 2024-08-08 RX ADMIN — AMMONIUM LACTATE: 12 CREAM TOPICAL at 00:00

## 2024-08-08 RX ADMIN — MUPIROCIN: 20 OINTMENT TOPICAL at 00:00

## 2024-08-08 ASSESSMENT — LOCATION DETAILED DESCRIPTION DERM
LOCATION DETAILED: RIGHT LATERAL PROXIMAL PRETIBIAL REGION
LOCATION DETAILED: LEFT PROXIMAL PRETIBIAL REGION

## 2024-08-08 ASSESSMENT — LOCATION ZONE DERM: LOCATION ZONE: LEG

## 2024-08-08 ASSESSMENT — SEVERITY ASSESSMENT: SEVERITY: MILD TO MODERATE

## 2024-08-08 ASSESSMENT — LOCATION SIMPLE DESCRIPTION DERM
LOCATION SIMPLE: LEFT PRETIBIAL REGION
LOCATION SIMPLE: RIGHT PRETIBIAL REGION

## 2024-08-08 NOTE — PROCEDURE: TREATMENT REGIMEN
Initiate Treatment: Wear compression stockings daily only during the day IF allowed/cleared by PCP and/or Cardiologist; remove compression stockings in the evening
Detail Level: Zone

## 2024-08-08 NOTE — HPI: RASH
What Type Of Note Output Would You Prefer (Optional)?: Bullet Format
How Severe Is Your Rash?: mild
Is This A New Presentation, Or A Follow-Up?: Rash
Additional History: Declines FBE \\nRash on R lower leg

## 2024-10-02 ENCOUNTER — HOSPITAL ENCOUNTER (OUTPATIENT)
Dept: HOSPITAL 83 - SDC | Age: 84
Discharge: HOME | End: 2024-10-02
Attending: OPHTHALMOLOGY
Payer: MEDICARE

## 2024-10-02 VITALS — WEIGHT: 210 LBS | HEIGHT: 62.99 IN | BODY MASS INDEX: 37.21 KG/M2

## 2024-10-02 VITALS — DIASTOLIC BLOOD PRESSURE: 50 MMHG | SYSTOLIC BLOOD PRESSURE: 104 MMHG

## 2024-10-02 VITALS — DIASTOLIC BLOOD PRESSURE: 75 MMHG | SYSTOLIC BLOOD PRESSURE: 113 MMHG

## 2024-10-02 VITALS — DIASTOLIC BLOOD PRESSURE: 56 MMHG

## 2024-10-02 VITALS — DIASTOLIC BLOOD PRESSURE: 74 MMHG

## 2024-10-02 DIAGNOSIS — I10: ICD-10-CM

## 2024-10-02 DIAGNOSIS — Z79.899: ICD-10-CM

## 2024-10-02 DIAGNOSIS — Z98.890: ICD-10-CM

## 2024-10-02 DIAGNOSIS — H25.11: Primary | ICD-10-CM

## 2024-11-06 ENCOUNTER — HOSPITAL ENCOUNTER (OUTPATIENT)
Dept: HOSPITAL 83 - SDC | Age: 84
Discharge: HOME | End: 2024-11-06
Attending: OPHTHALMOLOGY
Payer: MEDICARE

## 2024-11-06 VITALS — HEIGHT: 62.99 IN | WEIGHT: 210 LBS | BODY MASS INDEX: 37.21 KG/M2

## 2024-11-06 VITALS — SYSTOLIC BLOOD PRESSURE: 149 MMHG | DIASTOLIC BLOOD PRESSURE: 69 MMHG

## 2024-11-06 VITALS — DIASTOLIC BLOOD PRESSURE: 71 MMHG

## 2024-11-06 VITALS — DIASTOLIC BLOOD PRESSURE: 81 MMHG | SYSTOLIC BLOOD PRESSURE: 147 MMHG

## 2024-11-06 DIAGNOSIS — Z79.899: ICD-10-CM

## 2024-11-06 DIAGNOSIS — I10: ICD-10-CM

## 2024-11-06 DIAGNOSIS — Z98.890: ICD-10-CM

## 2024-11-06 DIAGNOSIS — H25.12: Primary | ICD-10-CM

## (undated) DEVICE — DRESSING HYDROFIBER AQUACEL AG ADVANTAGE 3.5X10 IN

## (undated) DEVICE — BIT DRILL SINGLE USE

## (undated) DEVICE — TOWEL,OR,DSP,ST,BLUE,STD,6/PK,12PK/CS: Brand: MEDLINE

## (undated) DEVICE — GLOVE ORTHO 8   MSG9480

## (undated) DEVICE — NEEDLE HYPO 22GA L1.5IN BLK POLYPR HUB S STL REG BVL STR

## (undated) DEVICE — TUBE IRRIG HNDPC HI FLO TP INTRPULS W/SUCTION TUBE

## (undated) DEVICE — SOLUTION IRRIG 3000ML 0.9% SOD CHL USP UROMATIC PLAS CONT

## (undated) DEVICE — PACK PROCEDURE SURG GEN CUST

## (undated) DEVICE — COVER HNDL LT DISP

## (undated) DEVICE — CHLORAPREP 26ML ORANGE

## (undated) DEVICE — SYRINGE,CONTROL,LL,FINGER,GRIP: Brand: MEDLINE INDUSTRIES, INC.

## (undated) DEVICE — GLOVE ORANGE PI 8 1/2   MSG9085

## (undated) DEVICE — GLOVE ORANGE PI 7 1/2   MSG9075

## (undated) DEVICE — PILLOW POS W15XH6XL22IN RASPBERRY FOAM ABD W/ STRP DISP FOR

## (undated) DEVICE — CONVERTORS STOCKINETTE: Brand: CONVERTORS

## (undated) DEVICE — 3M™ IOBAN™ 2 ANTIMICROBIAL INCISE DRAPE 6651EZ: Brand: IOBAN™ 2

## (undated) DEVICE — MARKER,SKIN,WI/RULER AND LABELS: Brand: MEDLINE

## (undated) DEVICE — DRAPE,TOP,102X53,STERILE: Brand: MEDLINE

## (undated) DEVICE — TOTAL HIP PK

## (undated) DEVICE — 2108 SERIES SAGITTAL BLADE, OFFSET (20.0 X 0.89 X 80.0MM)

## (undated) DEVICE — SOLUTION IV IRRIG WATER 1000ML POUR BRL 2F7114

## (undated) DEVICE — ELECTRODE PT RET AD L9FT HI MOIST COND ADH HYDRGEL CORDED

## (undated) DEVICE — SOLUTION IV IRRIG POUR BRL 0.9% SODIUM CHL 2F7124

## (undated) DEVICE — TUBING, SUCTION, 9/32" X 10', STRAIGHT: Brand: MEDLINE

## (undated) DEVICE — 3M™ STERI-DRAPE™ U-DRAPE 1015: Brand: STERI-DRAPE™

## (undated) DEVICE — 3M™ COBAN™ NL STERILE NON-LATEX SELF-ADHERENT WRAP, 2084S, 4 IN X 5 YD (10 CM X 4,5 M), 18 ROLLS/CASE: Brand: 3M™ COBAN™

## (undated) DEVICE — GOWN,SIRUS,FABRNF,XL,20/CS: Brand: MEDLINE

## (undated) DEVICE — WAX SURG 2.5GM HEMSTAT BNE BEESWAX PARAFFIN ISO PALMITATE

## (undated) DEVICE — DRAPE,REIN 53X77,STERILE: Brand: MEDLINE

## (undated) DEVICE — HEWSON SUTURE RETRIEVER: Brand: HEWSON SUTURE RETRIEVER

## (undated) DEVICE — GLOVE SURG SZ 85 L12IN FNGR THK13MIL WHT ISOLEX POLYISOPRENE

## (undated) DEVICE — BASIC SINGLE BASIN 1-LF: Brand: MEDLINE INDUSTRIES, INC.

## (undated) DEVICE — SPONGE LAP W18XL18IN WHT COT 4 PLY FLD STRUNG RADPQ DISP ST

## (undated) DEVICE — 4-PORT MANIFOLD: Brand: NEPTUNE 2

## (undated) DEVICE — STRIP,CLOSURE,WOUND,MEDI-STRIP,1/2X4: Brand: MEDLINE

## (undated) DEVICE — BIT DRL L5IN DIA2.4MM STD ST S STL TWST BUSA

## (undated) DEVICE — 1000 S-DRAPE TOWEL DRAPE 10/BX: Brand: STERI-DRAPE™

## (undated) DEVICE — DOUBLE BASIN SET: Brand: MEDLINE INDUSTRIES, INC.